# Patient Record
(demographics unavailable — no encounter records)

---

## 2024-10-18 NOTE — DATA REVIEWED
[de-identified] : MRI brain revealed mild microvascular ischemic changes, no acute infarct. [de-identified] : 9/3/24: CT head and CTA head neck unremarkable for acute infarct/hemorrhage, LVO, stenosis or core infarct.   TTE with bubble study consistent with PFO with left-to-right atrial shunt.   ESR 21. LDL 68

## 2024-10-18 NOTE — REVIEW OF SYSTEMS
[Numbness] : numbness [Tingling] : tingling [Migraine Headache] : migraine headaches [As Noted in HPI] : as noted in HPI [Anxiety] : anxiety [Negative] : Heme/Lymph [de-identified] : Neck pain [FreeTextEntry9] : Neck pain [de-identified] : panic attacks

## 2024-10-18 NOTE — PROCEDURE
[FreeTextEntry1] : EMG/NCV studies of upper extremities  There is electrodiagnostic evidence of mild median neuropathy at the wrists; right > left side, consistent with diagnosis of Carpal tunnel syndrome by CSI criteria on the right side.  In addition, there is evidence of early C5/6 radiculopathy on the right side

## 2024-10-18 NOTE — HISTORY OF PRESENT ILLNESS
[FreeTextEntry1] : Patient is here for follow-up visit today, last seen on 9/25/2024.  Patient reports she started taking venlafaxine 37.5 Mg daily, she reports she has not yet noted any improvement, however has not had any further panic episodes she continues to be anxious on and off.  Patient continues to have numbness in the right hand predominantly first 3 digits, she reports doing fine motor movements has become difficult, she works as an healthcare aide, reports she has constant right side neck pain that radiates to right shoulder girdle, lateral neck movements are painful.  Patient was recommended to start physical therapy, she has not started that yet as she could not get away from work.  Patient has followed up with interventional cardiologist Dr. Lee, she is going to hear from him soon regarding further workup and possible PFO closure

## 2024-10-18 NOTE — DISCUSSION/SUMMARY
[FreeTextEntry1] : 48 female with PMHx of HTN, anxiety/panic disorder, was admitted to [facility] ED on 9/3/2024 with acute onset right facial numbness, right arm numbness preceded by right-sided headache, code stroke evaluation done, CTA H/N no LVO stenosis or core infarct, NIHSS 1->0, patient monitored in telemetry, MRI brain no acute infarct.  TTE with bubble study c/w PFO with shunt, ESR 21.  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

## 2024-10-18 NOTE — REASON FOR VISIT
[Follow-Up: _____] : a [unfilled] follow-up visit [FreeTextEntry1] : For anxiety/TIA and migraines, and for EMGs/NCV studies of upper extremities to evaluate for carpal tunnel syndrome and cervical radiculopathy

## 2024-10-18 NOTE — PHYSICAL EXAM
[General Appearance - In No Acute Distress] : in no acute distress [General Appearance - Alert] : alert [General Appearance - Well-Appearing] : healthy appearing [Oriented To Time, Place, And Person] : oriented to person, place, and time [Mood] : the mood was normal [Person] : oriented to person [Place] : oriented to place [Time] : disoriented to time [Concentration Intact] : normal concentrating ability [Naming Objects] : no difficulty naming common objects [Repeating Phrases] : no difficulty repeating a phrase [Comprehension] : comprehension intact [Cranial Nerves Optic (II)] : visual acuity intact bilaterally,  visual fields full to confrontation, pupils equal round and reactive to light [Cranial Nerves Oculomotor (III)] : extraocular motion intact [Cranial Nerves Facial (VII)] : face symmetrical [Cranial Nerves Vestibulocochlear (VIII)] : hearing was intact bilaterally [Cranial Nerves Accessory (XI - Cranial And Spinal)] : head turning and shoulder shrug symmetric [Cranial Nerves Hypoglossal (XII)] : there was no tongue deviation with protrusion [Motor Tone] : muscle tone was normal in all four extremities [Motor Strength] : muscle strength was normal in all four extremities [FreeTextEntry4] : Patient speaks with nasal tone-slightly telegraphic speech, secondary to partially closed cleft palate [FreeTextEntry5] : Palate visible defect, Visible right upper lip scar secondary to left lip closure [FreeTextEntry1] : Neck ROM: Limited left > right lateral rotation, due to pain and spasm

## 2025-01-03 NOTE — REVIEW OF SYSTEMS
[Migraine Headache] : migraine headaches [Anxiety] : anxiety [Negative] : Heme/Lymph [Numbness] : no numbness [Tingling] : no tingling [As Noted in HPI] : as noted in HPI [Palpitations] : palpitations [de-identified] : Neck pain [de-identified] : Panic attacks  [FreeTextEntry9] : Neck pain

## 2025-01-03 NOTE — PHYSICAL EXAM
[General Appearance - Alert] : alert [General Appearance - In No Acute Distress] : in no acute distress [General Appearance - Well-Appearing] : healthy appearing [Oriented To Time, Place, And Person] : oriented to person, place, and time [Impaired Insight] : insight and judgment were intact [Affect] : the affect was normal [Mood] : the mood was normal [Person] : oriented to person [Place] : oriented to place [Time] : oriented to time [Registration Intact] : recent registration memory intact [Concentration Intact] : normal concentrating ability [Naming Objects] : no difficulty naming common objects [Repeating Phrases] : no difficulty repeating a phrase [Fluency] : fluency intact [Comprehension] : comprehension intact [Past History] : adequate knowledge of personal past history [Cranial Nerves Optic (II)] : visual acuity intact bilaterally,  visual fields full to confrontation, pupils equal round and reactive to light [Cranial Nerves Oculomotor (III)] : extraocular motion intact [Cranial Nerves Trigeminal (V)] : facial sensation intact symmetrically [Cranial Nerves Facial (VII)] : face symmetrical [Cranial Nerves Vestibulocochlear (VIII)] : hearing was intact bilaterally [Cranial Nerves Glossopharyngeal (IX)] : tongue and palate midline [Cranial Nerves Accessory (XI - Cranial And Spinal)] : head turning and shoulder shrug symmetric [Cranial Nerves Hypoglossal (XII)] : there was no tongue deviation with protrusion [Motor Tone] : muscle tone was normal in all four extremities [Motor Strength] : muscle strength was normal in all four extremities [No Muscle Atrophy] : normal bulk in all four extremities [Sensation Tactile Decrease] : light touch was intact [Pain / Temp Decrease Hand] : diminished right hand [Abnormal Walk] : normal gait [Balance] : balance was intact [2+] : Ankle jerk left 2+ [PERRL With Normal Accommodation] : pupils were equal in size, round, reactive to light, with normal accommodation [Extraocular Movements] : extraocular movements were intact [Full Visual Field] : full visual field [Auscultation Breath Sounds / Voice Sounds] : lungs were clear to auscultation bilaterally [Heart Rate And Rhythm] : heart rate was normal and rhythm regular [Heart Sounds] : normal S1 and S2 [Arterial Pulses Carotid] : carotid pulses were normal with no bruits [Edema] : there was no peripheral edema [Tremor] : no tremor present [Coordination - Dysmetria Impaired Finger-to-Nose Bilateral] : not present [Plantar Reflex Right Only] : normal on the right [Plantar Reflex Left Only] : normal on the left [FreeTextEntry4] : dysphonic speech - old [FreeTextEntry5] : Palate visible defect, Visible right upper lip scar secondary to left lip closure [] : the neck was supple [FreeTextEntry1] : Neck - ROM full

## 2025-01-03 NOTE — DATA REVIEWED
[de-identified] : 9/4/24: MRI brain revealed mild microvascular ischemic changes, no acute infarct. 12/5/2024: CT head, CTA head and neck: No evidence of intracranial aneurysm, critical stenosis or LVO.  No evidence of critical stenosis.  Soft tissue fullness in the posterior nasopharynx, likely lymphoid hyperplasia 9/3/24: CT head and CTA head neck unremarkable for acute infarct/hemorrhage, LVO, stenosis or core infarct.    [de-identified] : 10/18/24: EMG/NCV studies of upper extremities. There is electrodiagnostic evidence of mild median neuropathy at the wrists; R>L, consistent with diagnosis of Carpal tunnel syndrome by CSI criteria on the right side. In addition, there is evidence of early C5/6 radiculopathy on the right side 11/25/24: EDVIN:  PFO+ 9/4/24: TTE with bubble study consistent with PFO with left-to-right atrial shunt.   ESR 21. LDL 68

## 2025-01-03 NOTE — DISCUSSION/SUMMARY
[FreeTextEntry1] : 49 F with PMHx of HTN, anxiety/panic disorder, was admitted to [facility] ED on 9/3/2024 with acute onset right facial numbness, right arm numbness preceded by right-sided headache, code stroke evaluation done, CTA H/N no LVO stenosis or core infarct, NIHSS 1->0, patient monitored in telemetry, MRI brain no acute infarct.  TTE with bubble study c/w PFO with shunt, ESR 21.  # Status post PFO closure 12/18/2024 with intermittent palpitations, total resolution of migraine headaches since the procedure  -As discussed with Dr. Lee, patient should continue aspirin 81 Mg and clopidogrel 75 Mg daily for at least 3 months - Patient will follow-up with Dr. 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

## 2025-01-03 NOTE — REASON FOR VISIT
[Post ER] : a post ER visit [FreeTextEntry1] : PFO closure, Migraine, cervical radiculopathy, anxiety and panic

## 2025-01-03 NOTE — HISTORY OF PRESENT ILLNESS
[FreeTextEntry1] : Patient is here for follow-up visit today, was last seen in office on10/18/2024.  Patient reports she underwent PFO closure by Dr. Lee on 12/18/2024, it was uneventful, she has surprisingly noted total resolution of migraine headaches since, she is however complaining of intermittent palpitations where she feels like choking in the throat, this is not like her usual panic/anxiety attacks.  Patient reports she is taking aspirin in addition clopidogrel 75 Mg has been added.  Patient was started on venlafaxine 37.5 Mg for anxiety, she reports remarkable reduction of the anxiety and panic attacks, she is able to get good night sleep  Patient was seen in [facility] [place] ER on 12/5/2024 where she had presented with complaints of possible panic attack. 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  In ED, CTA head and neck was negative for LVO or bleed, ischemia or significant stenosis.  Patient was treated symptomatically with migraine cocktail and anxiety and discharged home.

## 2025-01-06 NOTE — DISCUSSION/SUMMARY
[FreeTextEntry1] : Stable post PFO closure.  Event recorder ordered for frequent palpitations.  F/U echo ordered. [EKG obtained to assist in diagnosis and management of assessed problem(s)] : EKG obtained to assist in diagnosis and management of assessed problem(s)

## 2025-01-06 NOTE — HISTORY OF PRESENT ILLNESS
[FreeTextEntry1] : 49 year old woman s/p PFO closure in 12/2024.  She has no chest pain or SOB since the procedure but has frequent palpitations usually associated with standing from a sitting position.

## 2025-01-27 NOTE — HISTORY OF PRESENT ILLNESS
[FreeTextEntry1] : 49 year old woman s/p cryptogenics stroke and PFO closure 12/18/2024.  She has had complaints of palpitations and chest pain since procedure.  EKG's today including during chest pain are normal. She just finished and returned her event monitor.

## 2025-01-27 NOTE — DISCUSSION/SUMMARY
[FreeTextEntry1] : Tachycardia and palpitations.  Metoprolol 25 mg succinate prescribed, pending event monitor report.  Awaitin authorization for post procedure echo [EKG obtained to assist in diagnosis and management of assessed problem(s)] : EKG obtained to assist in diagnosis and management of assessed problem(s)

## 2025-03-16 NOTE — HISTORY OF PRESENT ILLNESS
[FreeTextEntry1] : A 49-year-old patient presents with a cleft lip and palate that was partially repaired at the age of 6 years but remains incompletely closed. The patient has not undergone speech therapy and has no history of ear infections or ear tube placements. There is no family history of cleft lip or palate,  PMH: Status post PFO closure 12/18/2024; possible TIA The patient reports difficulty with speech and hypernasality.  She reports liquid escape from her nose with drinking and eating.  She reports snoring and difficulty breathing that has been her entire life Left lip and palate surgery were done in [facility] She has not had a previous cleft rhinoplasty or septoplasty  declined to provide

## 2025-04-15 NOTE — HISTORY OF PRESENT ILLNESS
[FreeTextEntry1] : A 49-year-old patient presents today for follow up evaluation of a cleft lip and palate that was partially repaired at the age of 6 years but remains incompletely closed. The patient has not undergone speech therapy and has no history of ear infections or ear tube placements. There is no family history of cleft lip or palate,  PMH: Status post PFO closure 12/18/2024; possible TIA The patient reports difficulty with speech and hypernasality.  She reports liquid escape from her nose with drinking and eating.  She reports snoring and difficulty breathing that has been her entire life Left lip and palate surgery were done in [facility] She has not had a previous cleft rhinoplasty or septoplasty